# Patient Record
(demographics unavailable — no encounter records)

---

## 2018-09-08 NOTE — RAD
CHEST ONE VIEW:

9/8/18

 

HISTORY: 

Tachycardia. Hypertension.

 

FINDINGS:  

Cardiac silhouette is magnified by projection. Pulmonary vasculature is unremarkable. Mild parenchyma
l scarring at the right lateral apex. No lobar consolidation or evidence of pneumothorax. Cardiac mon
itor leads overlie the chest. Postoperative changes right shoulder. 

 

IMPRESSION:  

No active cardiopulmonary abnormalities are demonstrated. 

 

POS: [facility]